# Patient Record
(demographics unavailable — no encounter records)

---

## 2024-12-10 NOTE — HISTORY OF PRESENT ILLNESS
[de-identified] : crusty eyes and cold for a few days  [FreeTextEntry6] : temp 99 in office  cold symptoms for a few days then awoke with right eye crusted shut, pink and puffy.

## 2024-12-10 NOTE — PHYSICAL EXAM
[Conjuctival Injection] : conjunctival injection [Clear] : left tympanic membrane clear [Erythema] : erythema [Erythematous Oropharynx] : erythematous oropharynx [NL] : warm, clear [FreeTextEntry5] : R>L +discharge

## 2024-12-10 NOTE — DISCUSSION/SUMMARY
[FreeTextEntry1] : Recommend supportive care with warm compresses and application of antibiotic eye drops. Return if symptoms worsen.  Symptoms likely due to viral URI. Recommend supportive care including antipyretics, fluids, and nasal saline followed by nasal suction. Return if symptoms worsen or persist.   independent historian parent  Records reviewed. Fully counseled. All questions and concerns addressed.

## 2025-01-02 NOTE — PHYSICAL EXAM
[Erythematous Oropharynx] : erythematous oropharynx [NL] : regular rate and rhythm, normal S1, S2 audible, no murmurs [de-identified] : wet cough [de-identified] : scattered patches of dry skin

## 2025-01-02 NOTE — DISCUSSION/SUMMARY
[FreeTextEntry1] : Symptoms likely due to viral URI. Recommend supportive care including antipyretics, fluids, and nasal saline followed by nasal suction. Return if symptoms worsen or persist.  rapid strep neg. send out throat cx  rvp pending   independent historian parent  Records reviewed. Fully counseled. All questions and concerns addressed.

## 2025-01-27 NOTE — DISCUSSION/SUMMARY
[FreeTextEntry1] : Counseled fully. PREWORK: Reviewed prior notes, reports, and results. Independent historian; parent.  Patient presents with parent for sick visit c/o loose stools. Pt is afebrile. *Parent reports pt exposed to older sister who has strep.  RAPID STREP: Positive 20 month girl found to be rapid strep positive. Complete 10 days of antibiotics.  After being on antibiotics for at least 24 hours patient less likely to spread infection. Prescribed Amoxicillin x 10 days.   Avoid fried, fatty, and spicy foods. Children can sometimes have a transient lactose intolerance after they have a stomach bug, so you may need to avoid dairy. Yogurt is still ok and Kefir products are loaded with healthy bacteria.  Rec probiotics.

## 2025-02-03 NOTE — HISTORY OF PRESENT ILLNESS
[FreeTextEntry6] : pt accompanied by mother  pt has had a fever since yesterday fever returns within 2 hours of medicine being given

## 2025-02-03 NOTE — PHYSICAL EXAM
[NL] : clear to auscultation bilaterally [Irritable] : irritable [FreeTextEntry4] : Nasal congestion

## 2025-02-03 NOTE — DISCUSSION/SUMMARY
[FreeTextEntry1] : Counseled fully. PREWORK: Reviewed prior notes, reports, and results. Independent historian; parent.  Patient presents with parent for sick visit c/o fever.  RAPID RSV: Negative RAPID FLU: Negative Patient was swabbed for RVP with Covid-19 test. Will call in 48 hrs with results.  Symptoms likely due to viral URI. Recommend supportive care including antipyretics, fluids, and nasal saline followed by nasal suction. Return if symptoms worsen or persist.  20 month girl found to be rapid strep positive. Complete 10 days of antibiotics.  After being on antibiotics for at least 24 hours patient less likely to spread infection. Symptoms likely due to viral URI. Recommend supportive care including antipyretics, fluids, and nasal saline followed by nasal suction. Return if symptoms worsen or persist. FINISH AMOXIL AS DIRECTED.  REC CONTINUE WITH SUPPORTIVE CARE.

## 2025-04-07 NOTE — PHYSICAL EXAM
[Conjuctival Injection] : conjunctival injection [Discharge] : discharge [Erythematous Oropharynx] : erythematous oropharynx [Enlarged Tonsils] : enlarged tonsils [NL] : clear to auscultation bilaterally

## 2025-04-07 NOTE — HISTORY OF PRESENT ILLNESS
[FreeTextEntry6] : PT IS 22 MONTHS OLD, HERE WITH MOM PT IS HERE FOR SICK VISIT C/O BOTH EYES RED AND CRUSTY, LOW-GRADE FEVER HIGHEST TEMP 100  MOM REPORTS BOTH EYES WAS CRUSTED SHUT THIS MORNING  EYES STARTED FRIDAY NIGHT INTO SATURDAY  MOM ALSO REPORTS PT COUGH & RUNNY NOSE

## 2025-04-07 NOTE — DISCUSSION/SUMMARY
[FreeTextEntry1] : Counseled fully. PREWORK: Reviewed prior notes, reports, and results. Independent historian; parent.  Patient presents with parent for sick visit c/o red crusty eyes, low grade fevers, cough, and runny nose.  RAPID STREP: Negative PATIENT SWABBED IN OFFICE FOR THROAT CULTURE.  WILL F/U WITH RESULTS IN 48HRS.  Recommend supportive care with warm compresses and application of antibiotic eye drops. Return if symptoms worsen. Prescribed Ocuflox x 7 days. Contagious until 24 hours on drops and redness/discharge resolves.  Symptoms likely due to viral URI. Recommend supportive care including antipyretics, fluids, and nasal saline followed by nasal suction. Return if symptoms worsen or persist.  Advised to continue monitoring temperature and treat PRN with Tylenol or Motrin.  Ensure adequate hydration with Pedialyte or Gatorade. Keep patient comfortable.  Will be contagious until 24hrs fever free.   REC CONTINUE WITH SUPPORTIVE CARE.

## 2025-05-22 NOTE — DEVELOPMENTAL MILESTONES
[Normal Development] : Normal Development [Plays alongside other children] : plays alongside other children [Takes off some clothing] : takes off some clothing [Scoops well with spoon] : scoops well with spoon [Uses 50 words] : uses 50 words [Combine 2 words into phrase or] : combines 2 words into phrase or sentences [Follows 2-step command] : follows 2-step command [Uses words that are 50% intelligible] : uses words that are 50% intelligible to strangers [Kicks ball] : kicks ball  [Jumps off ground with 2 feet] : jumps off ground with 2 feet [Runs with coordination] : runs with coordination [Climbs up a ladder at a] : climbs up a ladder at a playground [Stacks objects] : stacks objects [Turns book pages] : turns book pages [Uses hands to turn objects] : uses hands to turn objects [Yes] : Completed.

## 2025-05-22 NOTE — PHYSICAL EXAM

## 2025-05-22 NOTE — HISTORY OF PRESENT ILLNESS
[Mother] : mother [Cow's milk (Ounces per day ___)] : consumes [unfilled] oz of Cow's milk per day [Normal] : Normal [Sippy cup use] : Sippy cup use [Brushing teeth] : Brushing teeth [Yes] : Patient goes to dentist yearly [Toothpaste] : Primary Fluoride Source: Toothpaste [No] : Not at  exposure [Water heater temperature set at <120 degrees F] : Water heater temperature set at <120 degrees F [Car seat in back seat] : Car seat in back seat [Smoke Detectors] : Smoke detectors [Carbon Monoxide Detectors] : Carbon monoxide detectors [Exposure to electronic nicotine delivery system] : No exposure to electronic nicotine delivery system [At risk for exposure to TB] : Not at risk for exposure to Tuberculosis [de-identified] : NO CONCERNS [de-identified] : WELL BALANCED DIET [FreeTextEntry9] : MOM AND ME CLASS [de-identified] : HEP A [NO] : No [FreeTextEntry1] : PT IS HERE WITH MOM, HERE FOR 2YR [place] --> HEP A #2 EATING SLEEPING AND POOPING NORMAL  DOING WELL OVERALL   OAE: BOTH EARS PASSED  P/S: NO RISK FACTOR IDENTIFIED